# Patient Record
Sex: FEMALE | Race: WHITE
[De-identification: names, ages, dates, MRNs, and addresses within clinical notes are randomized per-mention and may not be internally consistent; named-entity substitution may affect disease eponyms.]

---

## 2017-08-31 ENCOUNTER — HOSPITAL ENCOUNTER (EMERGENCY)
Dept: HOSPITAL 53 - M ED | Age: 37
Discharge: HOME | End: 2017-08-31
Payer: COMMERCIAL

## 2017-08-31 VITALS — SYSTOLIC BLOOD PRESSURE: 162 MMHG | DIASTOLIC BLOOD PRESSURE: 95 MMHG

## 2017-08-31 VITALS — BODY MASS INDEX: 28.17 KG/M2 | HEIGHT: 66 IN | WEIGHT: 175.27 LBS

## 2017-08-31 DIAGNOSIS — K04.7: Primary | ICD-10-CM

## 2019-01-17 ENCOUNTER — HOSPITAL ENCOUNTER (OUTPATIENT)
Dept: HOSPITAL 53 - M LAB REF | Age: 39
End: 2019-01-17
Attending: NURSE PRACTITIONER
Payer: COMMERCIAL

## 2019-01-17 DIAGNOSIS — Z13.9: Primary | ICD-10-CM

## 2019-01-17 LAB
25(OH)D3 SERPL-MCNC: 20.5 NG/ML (ref 30–100)
ALBUMIN SERPL BCG-MCNC: 3.7 GM/DL (ref 3.2–5.2)
ALT SERPL W P-5'-P-CCNC: 14 U/L (ref 12–78)
BASOPHILS # BLD AUTO: 0.1 10^3/UL (ref 0–0.2)
BASOPHILS NFR BLD AUTO: 0.6 % (ref 0–1)
BILIRUB SERPL-MCNC: 0.2 MG/DL (ref 0.2–1)
BUN SERPL-MCNC: 8 MG/DL (ref 7–18)
CALCIUM SERPL-MCNC: 9 MG/DL (ref 8.5–10.1)
CHLORIDE SERPL-SCNC: 106 MEQ/L (ref 98–107)
CHOLEST SERPL-MCNC: 181 MG/DL (ref ?–200)
CHOLEST/HDLC SERPL: 3.48 {RATIO} (ref ?–5)
CO2 SERPL-SCNC: 28 MEQ/L (ref 21–32)
CREAT SERPL-MCNC: 0.72 MG/DL (ref 0.55–1.3)
EOSINOPHIL # BLD AUTO: 0.1 10^3/UL (ref 0–0.5)
EOSINOPHIL NFR BLD AUTO: 0.6 % (ref 0–3)
EST. AVERAGE GLUCOSE BLD GHB EST-MCNC: 105 MG/DL (ref 60–110)
GFR SERPL CREATININE-BSD FRML MDRD: > 60 ML/MIN/{1.73_M2} (ref 58–?)
GLUCOSE SERPL-MCNC: 83 MG/DL (ref 70–100)
HCT VFR BLD AUTO: 39.9 % (ref 36–47)
HDLC SERPL-MCNC: 52 MG/DL (ref 40–?)
HGB BLD-MCNC: 13.1 G/DL (ref 12–15.5)
LDLC SERPL CALC-MCNC: 115 MG/DL (ref ?–100)
LYMPHOCYTES # BLD AUTO: 2.1 10^3/UL (ref 1.5–4.5)
LYMPHOCYTES NFR BLD AUTO: 18.3 % (ref 24–44)
MCH RBC QN AUTO: 28.7 PG (ref 27–33)
MCHC RBC AUTO-ENTMCNC: 32.8 G/DL (ref 32–36.5)
MCV RBC AUTO: 87.3 FL (ref 80–96)
MONOCYTES # BLD AUTO: 0.5 10^3/UL (ref 0–0.8)
MONOCYTES NFR BLD AUTO: 4.6 % (ref 0–5)
NEUTROPHILS # BLD AUTO: 8.5 10^3/UL (ref 1.8–7.7)
NEUTROPHILS NFR BLD AUTO: 75.6 % (ref 36–66)
NONHDLC SERPL-MCNC: 129 MG/DL
PLATELET # BLD AUTO: 542 10^3/UL (ref 150–450)
POTASSIUM SERPL-SCNC: 4.2 MEQ/L (ref 3.5–5.1)
PROT SERPL-MCNC: 7.5 GM/DL (ref 6.4–8.2)
RBC # BLD AUTO: 4.57 10^6/UL (ref 4–5.4)
SODIUM SERPL-SCNC: 140 MEQ/L (ref 136–145)
TRIGL SERPL-MCNC: 72 MG/DL (ref ?–150)
TSH SERPL DL<=0.005 MIU/L-ACNC: 1.77 UIU/ML (ref 0.36–3.74)
WBC # BLD AUTO: 11.2 10^3/UL (ref 4–10)

## 2019-02-27 ENCOUNTER — HOSPITAL ENCOUNTER (OUTPATIENT)
Dept: HOSPITAL 53 - M ADAMS | Age: 39
End: 2019-02-27
Attending: PHYSICIAN ASSISTANT
Payer: COMMERCIAL

## 2019-02-27 ENCOUNTER — HOSPITAL ENCOUNTER (EMERGENCY)
Dept: HOSPITAL 53 - M ED | Age: 39
Discharge: HOME | End: 2019-02-27
Payer: COMMERCIAL

## 2019-02-27 VITALS — WEIGHT: 175.38 LBS | HEIGHT: 66 IN | BODY MASS INDEX: 28.19 KG/M2

## 2019-02-27 VITALS — DIASTOLIC BLOOD PRESSURE: 83 MMHG | SYSTOLIC BLOOD PRESSURE: 147 MMHG

## 2019-02-27 DIAGNOSIS — K04.7: Primary | ICD-10-CM

## 2019-02-27 DIAGNOSIS — M54.2: Primary | ICD-10-CM

## 2019-02-27 NOTE — REP
Clinical:  Cervicalgia

 

Technique:  AP, lateral, flexion/extension, swimmer's, bilateral oblique, and

open-mouth views of the cervical spine.

 

Findings:

Alignment is maintained.  Early advanced multilevel degenerative changes include

endplate sclerosis/heterogeneity with marginal spurring and disc space narrowing

primarily involving C4-5 through C6-7.  Open mouth view demonstrates normal C1-C2

articulation and odontoid process.  Oblique views demonstrate patent neural

foramen.

 

Impression:

Early advanced multilevel degenerative spondylosis.

 

 

Electronically Signed by

Kadeem Canada MD 02/27/2019 11:54 A

## 2024-09-16 ENCOUNTER — HOSPITAL ENCOUNTER (OUTPATIENT)
Dept: HOSPITAL 53 - M OUTALCOH | Age: 44
End: 2024-09-16
Attending: PSYCHIATRY & NEUROLOGY
Payer: MEDICAID

## 2024-09-16 DIAGNOSIS — F12.20: ICD-10-CM

## 2024-09-16 DIAGNOSIS — F17.200: ICD-10-CM

## 2024-09-16 DIAGNOSIS — F10.20: Primary | ICD-10-CM

## 2024-09-25 ENCOUNTER — HOSPITAL ENCOUNTER (OUTPATIENT)
Dept: HOSPITAL 53 - M OUTALCOH | Age: 44
LOS: 5 days | End: 2024-09-30
Attending: PSYCHIATRY & NEUROLOGY
Payer: MEDICAID

## 2024-09-25 DIAGNOSIS — F17.200: ICD-10-CM

## 2024-09-25 DIAGNOSIS — F12.20: ICD-10-CM

## 2024-09-25 DIAGNOSIS — F10.20: Primary | ICD-10-CM

## 2024-10-23 ENCOUNTER — HOSPITAL ENCOUNTER (OUTPATIENT)
Dept: HOSPITAL 53 - M OUTALCOH | Age: 44
LOS: 8 days | End: 2024-10-31
Attending: PSYCHIATRY & NEUROLOGY
Payer: MEDICAID

## 2024-10-23 DIAGNOSIS — F17.200: ICD-10-CM

## 2024-10-23 DIAGNOSIS — F12.20: ICD-10-CM

## 2024-10-23 DIAGNOSIS — F10.20: Primary | ICD-10-CM
